# Patient Record
(demographics unavailable — no encounter records)

---

## 2019-12-11 NOTE — REP
OB ULTRASOUND:

 

Real-time sonographic evaluation of the gravid uterus performed. There is a

single living intrauterine gestation, estimated gestational age 18 weeks 4 days,

EDC 05/09/2020. Today's measurements indicate appropriate growth.

 

BPD 45 mm = 19 weeks 4 days, 77th percentile

 

 mm = 18 weeks 5 days, 52nd percentile

 

 mm = 18 weeks 4 days, 49th percentile

 

FL 27 mm = 18 weeks 1 day, 39th percentile

 

HC/AC ratio 1.21 within normal range. Estimated fetal weight 240 grams, 41st

percentile. Cervix is closed and measures 4.3 cm in length. Fetal heart rate 139

beats per minute.

 

 

 

                                       SEEN/GROSSLY UNREMARKABLE

 

Lateral ventricles         yes

 

Posterior fossa            yes

 

Upper lip                  yes

 

Four-chamber heart         yes

 

LVOT                       yes

 

RVOT                       no

 

Stomach                    yes

 

Cord insertion             yes

 

Three vessel cord          yes

 

Kidneys                    yes

 

Bladder                    yes

 

Spine                      yes

 

Fetal position:            Vertex.

 

Placenta:        Posterior and grade 1 with no previa or abruption.

 

Amniotic fluid:       Within normal limits.

 

 

 

 

Electronically Signed by

Gerardo Hung MD 12/16/2019 10:10 A

## 2020-01-07 NOTE — REP
Clinical:  Anatomical evaluation.

 

Comparison: 12/11/2019.

 

Findings:

Examination demonstrates a single live intrauterine pregnancy in transverse

presentation.  Fetal motion is identified by technologist.  Placenta is noted

posterior and grade 0 without evidence for placenta previa or abruption.

Amniotic fluid volume is normal.  Cervix measures 6.5 cm in length and appears

closed.  No evidence for nuchal cord.

 

Gestational age by LMP 22 weeks 3 days with DENI 05/09/2020 .

Gestational age by current measurements 23 weeks 1 day with DENI 05/04/2020.

 

FHR equals 141 beats per minute.

Estimated fetal weight 521 grams ( 53rd percentile).

 

Anatomical assessment demonstrates normal facial features and right cardiac

ventricular outflow tract.

 

Impression:

Single live intrauterine pregnancy in transverse lie demonstrating appropriate

interval growth.

2.  In conjunction with prior examination anatomical assessment is complete and

normal.

 

 

Electronically Signed by

Roman Fernández MD 01/07/2020 07:00 P

## 2020-05-01 NOTE — IPNPDOC
Text Note


Date of Service


The patient was seen on 5/1/20.





NOTE


Outpatient


Has ambulated for 2 hours.  


Cat I tracing, with mild irregular UC


No cervical changed





Discharged home. Rec rest, hydrate, tylenol


Pt instructed to call with increased UC, LOF, bleeding


Keep appt next week if no labor





VS,Fishbone, I+O


VS, Fishbone, I+O





Vital Signs








  Date Time  Temp Pulse Resp B/P (MAP) Pulse Ox O2 Delivery O2 Flow Rate FiO2


 


5/1/20 13:38  102 18 125/80 (95)  Room Air  


 


5/1/20 13:38 98.5       

















Aide Hawthorne CNM   May 1, 2020 13:50

## 2020-05-01 NOTE — IPNPDOC
Text Note


Date of Service


The patient was seen on 5/1/20.





NOTE


Outpatient





21yo G1 DENI 5/9/2020. Presents @ 38w6d with reports of UC since 0300.  Denies 

LOF, bleeding. Reports good fetal activity.





NAD, resting in bed


VSS


, + accels, occasional variable decels


UC 2-4 minutes apart, mild


SVE 2/80/-2, soft.  Exam 2 days ago 2/50/-2





Will ambulate and reassess





VS,Fishbone, I+O


VS, Fishbone, I+O





Vital Signs








  Date Time  Temp Pulse Resp B/P (MAP) Pulse Ox O2 Delivery O2 Flow Rate FiO2


 


5/1/20 10:53 98.8  18   Room Air  

















Aide Hawthorne CNM   May 1, 2020 11:13

## 2020-05-15 NOTE — IPNPDOC
Text Note


Date of Service


The patient was seen on 5/15/20.





NOTE


Progress





Has received stadol and phenergan.  UC have subsequently spaced out 

approximately 6 minutes apart


, minimal variabiltiy due to stadol, previously Cat I


SVE 4/90/-2, light show





Will start pitocin augmentation. Pt is considering epidural





VS,Fishbone, I+O


VS, Fishbone, I+O


Laboratory Tests


5/15/20 16:25











Vital Signs








  Date Time  Temp Pulse Resp B/P (MAP) Pulse Ox O2 Delivery O2 Flow Rate FiO2


 


5/15/20 18:15 97.6       


 


5/15/20 18:08   18     


 


5/15/20 18:03  93  123/70 (87)    

















Aide Hawthorne CNM  May 15, 2020 19:11

## 2020-05-15 NOTE — IPNPDOC
Text Note


Date of Service


The patient was seen on 5/15/20.





NOTE


Progress





Comfortable with epidural


Pitocin @ 2mu


UC 2-3 minutes apart x 60 seconds


, Cat I


SVE 5/90/-1, anterior





Enc rest. Anticipate NSVB





VS,Fishbone, I+O


VS, Fishbone, I+O


Laboratory Tests


5/15/20 16:25











Vital Signs








  Date Time  Temp Pulse Resp B/P (MAP) Pulse Ox O2 Delivery O2 Flow Rate FiO2


 


5/15/20 19:37 98.4 91 18 129/78 (95)    

















Aide Hawthorne CNM  May 15, 2020 22:54

## 2020-05-15 NOTE — HPEPDOC
Obstetrical History & Physical


General


Date of Admission


May 15, 2020 at 15:29





History of Present Illness


Chief Complaint:  Contractions, term


Information Provided By:  Patient


Age:  22


:  1


Term:  0


Pre-term:  0


Abortions:  0


Livin





Prenatal Care


Prenatal Care:  [facility]





Prenatal Dating


Final EDC by:  LMP


EGA at Admission:  40 (+6)





Antepartum Course


Height (inches):  63


Pre-Pregnancy weight (lbs.):  144


Admission Weight (lbs.):  183.6





Past Medical History


Past Obstetrical History :  


   Past Obstetrical History:  Primgravida


GYN History:  No pertinent history


Past Medical History


Surgical History:  Tonsilectomy





Family History


Significant Family History:  Cancer (mother stage 2 breast CA), Diabetes, Heart 

disease, Hypertension





Social History


Marital Status:  Single


Family situation:  Spouse/partner home


Psychosocial History:  Depression (stopped then restarted lexapro with 

pregnancy)


* Smoker:  non-smoker


Alcohol:  Denies


Drugs:  denies





Abuse Violence Screening


Have you been hit/kicked/slapp:  No


Have you been sexually assault:  No





Prenatal Imunizations


Tdap status:  current


Influenza Status:  current





Allergies


Coded Allergies:  


     cefaclor (Verified  Allergy, Unknown, 5/15/20)





Medications


Scheduled


Escitalopram Oxalate (Lexapro) 10 Mg Tablet, 10 MG PO DAILY


Prenatal No.137/Iron/Folic Acd (Prenatal Vitamin Tablet) 1 Each Tablet, 1 TAB PO

DAILY





Scheduled PRN


Acetaminophen (Mapap) 500 Mg Tablet, 500 MG PO Q6HP PRN for PAIN





Physical Examination


Physical Examination


GENERAL: Alert and oriented times three.


BREAST: .


ABDOMEN: Gravid and non-tender to touch.


FETUS: Is vertex (VTX) by sterile vaginal examination (SVE), fetus is vertex 

(VTX) by Leopold.


HEART RATE: Regular rate and rhythm.


LUNGS: Clear to auscultation (CTA).


EXTREMITIES: No edema. No clonus. Deep tendon reflexes (DTRs) + 2.





Vital Signs/I&O





Vital Signs








  Date Time  Temp Pulse Resp B/P (MAP) Pulse Ox O2 Delivery O2 Flow Rate FiO2


 


5/15/20 15:47 98.3 98 18 124/81 (95)    











Laboratory Data


[facility]


Laboratory Tests 2


5/15/20 15:39: Serology Scanned Report Hepatitis B Testing


Urine Culture:  No Growth





Pertinent Laboratoy Data


Blood Type:  O+


RBC Antibody Screen:  Negative


HIV:  Negative


Hepatitis B:  Negative


Hepatitis C:  Negative


Rapid Plasma Reagin:  Nonreactive


Rubella:  Immune


Chlamydia/Gonorrhea:  Negative


Group B Streptococcus:  Negative


Quad Screen Test:  Declined


Glucose Tolerance Test:  110





Anatomy Ultrasound


Placenta Location:  Posterior


Normal Anatomy:  Yes


Placenta Previa:  No


Estimated Fetal Weight (grams):  240 (41%)





Other Ultrasounds


10/10/2019  dating 9w2d


F/u anatomy WNL EFW 521g, 53%





 Steroid Therapy


 Steroid Therapy:  No





Vaginal Examination


Dilation:  3 cm


Effacement:  90%


Station:  -2


Cervical Consistency:  Medium


Cervical Position:  Middle


Fetal Presentation:  Cephalic presentation





Fetal Assessment


Fetal Heart Rate (FHR):  135


Variability:  Moderate


Accelerations:  Positive


Decelerations:  None





Tocometer


Contractions:  Yes


Frequency:  regular, every 3-7 min.


Duration:  greater than 60 seconds


Strength:  palpated as moderate





Assessment/Plan


Assessment


Alvin is a 22-year-old  (G)1 para (P)0-0-0-0 at 40+6 weeks by 9-week 

ultrasound. Presents to Labor and Delivery (L&D) with reports of contractions 

and bloody show since 0600. Stronger since noon. NST in office showed Cat I 

tracing, UC 4-5 minutes x  seconds, moderate. SVE in office 3/90/-, 

change from exam earlier this week.





Plan


Admit and orient.


 and consent.


Diet: regular.


Group B Streptococcus (GBS) negative.


Labs and intravenous (IV) per unit protocol.


Counseled on Pitocin and induction of labor (IOL).


Lactated Ringers (LR): Bolus 500 mL, then saline lock.


Plans to labor ad raya. Is considering IV pain management vs epidural


Anticipate normal spontaneous delivery ().


C-S as appropriate.











Aide Hawthorne CNM  May 15, 2020 16:14

## 2020-05-16 NOTE — IPNPDOC
Text Note


Date of Service


The patient was seen on 5/16/20.





NOTE


Feeling pressure/pain rectally


SROM scant clear fluid 0249


Pitocin remains @ 2 mu


UC Q 2-3 minutes x 60 seconds


, Cat I


SVE 8-9/100/-1-0. Forebag palpable.  


Offered AROM, pt declines at this time.





VS,Fishbone, I+O


VS, Fishbone, I+O


Laboratory Tests


5/15/20 16:25











Vital Signs








  Date Time  Temp Pulse Resp B/P (MAP) Pulse Ox O2 Delivery O2 Flow Rate FiO2


 


5/16/20 00:57  94  132/75 (94)    


 


5/15/20 23:59 99.1  16     














I&O- Last 24 Hours up to 6 AM 


 


 5/16/20





 06:00


 


Intake Total 500 ml


 


Output Total 350 ml


 


Balance 150 ml

















Aide Hawthorne CNM  May 16, 2020 05:22

## 2020-05-16 NOTE — IPNPDOC
Text Note


Date of Service


The patient was seen on 5/16/20.





NOTE


Pt requesting AROM of forebag.


9cm, mostly on left side, 0 station


AROM, scant clear fluid


Cat I tracing.





VS,Fishbone, I+O


VS, Fishbone, I+O


Laboratory Tests


5/15/20 16:25











Vital Signs








  Date Time  Temp Pulse Resp B/P (MAP) Pulse Ox O2 Delivery O2 Flow Rate FiO2


 


5/16/20 06:58  121  128/88 (101)    


 


5/16/20 03:39 99.8  16     














I&O- Last 24 Hours up to 6 AM 


 


 5/16/20





 06:00


 


Intake Total 500 ml


 


Output Total 350 ml


 


Balance 150 ml

















Aide Hawthorne CNM  May 16, 2020 07:08